# Patient Record
Sex: FEMALE | Race: WHITE | NOT HISPANIC OR LATINO | Employment: UNEMPLOYED | URBAN - METROPOLITAN AREA
[De-identification: names, ages, dates, MRNs, and addresses within clinical notes are randomized per-mention and may not be internally consistent; named-entity substitution may affect disease eponyms.]

---

## 2017-06-26 ENCOUNTER — HISTORICAL (OUTPATIENT)
Dept: ADMINISTRATIVE | Facility: HOSPITAL | Age: 32
End: 2017-06-26

## 2017-06-28 ENCOUNTER — HISTORICAL (OUTPATIENT)
Dept: MEDSURG UNIT | Facility: HOSPITAL | Age: 32
End: 2017-06-28

## 2017-06-28 ENCOUNTER — HISTORICAL (OUTPATIENT)
Dept: ADMINISTRATIVE | Facility: HOSPITAL | Age: 32
End: 2017-06-28

## 2017-07-12 ENCOUNTER — HISTORICAL (OUTPATIENT)
Dept: ADMINISTRATIVE | Facility: HOSPITAL | Age: 32
End: 2017-07-12

## 2017-08-11 ENCOUNTER — HISTORICAL (OUTPATIENT)
Dept: ADMINISTRATIVE | Facility: HOSPITAL | Age: 32
End: 2017-08-11

## 2018-11-14 ENCOUNTER — HISTORICAL (OUTPATIENT)
Dept: LAB | Facility: HOSPITAL | Age: 33
End: 2018-11-14

## 2019-01-11 ENCOUNTER — HISTORICAL (OUTPATIENT)
Dept: RADIOLOGY | Facility: HOSPITAL | Age: 34
End: 2019-01-11

## 2022-04-07 ENCOUNTER — HISTORICAL (OUTPATIENT)
Dept: ADMINISTRATIVE | Facility: HOSPITAL | Age: 37
End: 2022-04-07

## 2022-04-19 ENCOUNTER — HISTORICAL (OUTPATIENT)
Dept: ADMINISTRATIVE | Facility: HOSPITAL | Age: 37
End: 2022-04-19

## 2022-04-23 VITALS
OXYGEN SATURATION: 95 % | BODY MASS INDEX: 21.12 KG/M2 | HEIGHT: 65 IN | WEIGHT: 126.75 LBS | DIASTOLIC BLOOD PRESSURE: 75 MMHG | SYSTOLIC BLOOD PRESSURE: 120 MMHG

## 2022-04-30 NOTE — H&P
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 Went to ED on [facility] and here for first ortho visit, severe R index finger pain.   Review of Systems Constitutional_no fever, no chills  Eye_no trauma  Respiratory_no increased work of breathing  Cardiovascular_regular rate and rhythm  Gastrointestinal_no vomiting  Genitourinary_no burning with urination  Hema/Lymph_no hemorrhage  Musculoskeletal_see note  Integumentary_no rash over extremity  Neurologic_grossly intact, see PE section Physical Exam   Vitals & Measurements HT: 165.10 cm HT: 165.10 cm WT: 62.3 kg WT: 62.3 kg BMI: 22.86 Gen: NAD, A+Ox3  Cardio: RRR  Pulm: No increased WOB    RUE:  Skin intact  deformity due to dorsal subluxation of R index finger PIP joint  Swelling about R index PIP jiont  TTP about R index finger  Motor: intact ain, pin, ulnar  SILT: m/u/r  2+ RP, bcr, wwp    Assessment/Plan 32yF with R P2 base fracture.  - Will place in splint  - NWB RUE  - booked for ORIF vs CRPP vs ex fix of R index finger P2 base fracture on 6/29 Problem List/Past Medical History Tobacco user Historical Back pain Procedure/Surgical History Application of finger splint; static (06/09/2017), Initial treatment, first degree burn, when no more than local treatment is required (06/14/2016), C Section (2009), C Section (2003), Pyloric stenosis sx (1984), BACK SX. Medications CIPROFLOXACIN  MG TABS, 500 mg, 1 tab(s), Oral, BID, Not taking diclofenac sodium 75 mg oral delayed release tablet, 75 mg, 1 tab(s), Oral, BID GABAPENTIN 300 MG CAPS TRAMADOL HCL 50 MG TABS, Not taking Allergies Maria Luisa Aspirin Sugar Free Ultram (break out in rash) Social History   Alcohol - Denies Alcohol Use   Never   Substance Abuse - Denies Substance Abuse   Never   Tobacco - High  Risk   Current every day smoker, Cigarettes, 7 per day. Started age 18.0 Years. Previous treatment: None. Ready to change: Yes.   Cigarettes   Current, Cigarettes, 10 per day. Diagnostic Results XR R hand: P2 base fracture of R index finger with dorsal subluxation of PIP joint. volar fragment roughly 50% of joint surface.   Addendum by Gerard Allred MD on June 26, 2017 11:28:32 CDT (Verified)  Esme's medical history, physical exam findings right index finger, diagnosis, and treatment outlined by the PG3 resident has been reviewed. Operative treatment plan is determined to be reasonable and appropriate. I was present during the evaluation. X-rays right index finger reviewed.  Smoking cessation is important.  Addendum by Thien Beltran MD on June 28, 2017 15:58:50 CDT (Verified)  I have seen and evaluated the patient and there are no changes to the H&P.  Result type: Office/Clinic Note-Physician   Result date: June 26, 2017 11:19 CDT   Result status: Modified   Result title: Office Visit Note   Performed by: Thien Beltran MD on June 26, 2017 11:21 CDT   Verified by: Thien Beltran MD on June 26, 2017 11:21 CDT   Encounter info: [id number], [facility], Clinic Visit, 6/26/2017 - 6/26/2017   Document has been moved by HIM Specialist.

## 2022-04-30 NOTE — DISCHARGE SUMMARY
DISCHARGE DATE:  06/28/2017    ADMISSION DIAGNOSIS:  Closed fracture dislocation of the right index finger middle phalanx.    POSTOPERATIVE DIAGNOSIS:  Closed fracture dislocation of the right index finger middle phalanx.    PROCEDURE PERFORMED:  Open reduction, internal fixation of right index finger P2 base fracture dislocation.    HISTORY AND HOSPITAL COURSE:  The patient is a 32-year-old female who sustained a right index finger P2 base fracture nearly 3 weeks prior to her presentation, on 06/08/2017.  She was admitted to [facility] by [facility] and taken to the OR on 06/28/2017 for open reduction, internal fixation of a right index finger P2 base fracture dislocation.  Patient tolerated the procedure well, no complications.  See operative report for further details.  Postoperative course was benign.  Patient was discharged with proper wound care, followup, weightbearing restriction instructions provided.    WEIGHTBEARING RESTRICTION INSTRUCTIONS:    1. Nonweightbearing, right upper right upper extremity, for 6 weeks.  2. Leave splint in place until followup.  Pin will be in place for 4 weeks.    DISCHARGE DIET:  Regular.    DISCHARGE MEDICATIONS:  See MAR.    FOLLOWUP:  10-14 days.        ______________________________  terri Beltran MD    ______________________________  Ulices Allred M.D.    ADEN/CLARISSA  DD:  07/02/2017  Time:  08:50PM  DT:  07/03/2017  Time:  05:48AM  Job #:  [id number]
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
## 2022-05-01 NOTE — HISTORICAL [facility] CERNER
This is a historical note converted from [facility]. Formatting and pictures may have been removed.  Please reference Samanta for original formatting and attached multimedia. Chief Complaint  s/p ORIF right index finger  History of Present Illness  32F here for first follow up of ORIF of R 2nd PIP fx-dislocation.? Doing well except for pain and stiffness.? Notes numbness about the incision.? Mobic made her nauseous.?  Physical Exam  Vitals & Measurements  HT:?165?cm? HT:?165?cm? WT:?63.5?kg? WT:?63.5?kg? BMI:?23.32?  RUE  Incision well healed without signs of infection  decreased sensation at ulnar?corner of Lucia  PIP motion 0-20, DIP 0-30  NVID  Assessment/Plan  1.?Closed displaced fracture of middle phalanx of finger of right hand  ?Pt very stiff.? Will get into OT asap for aggressive ROM.? Given Rx to take to [place] therapy.?  ?RTC in 6 weeks for motion check  Ordered:  Clinic Follow up, *Est. 09/22/17 3:00:00 CDT, Order for future visit, Closed displaced fracture of middle phalanx of finger of right hand, [facility]  ?   Problem List/Past Medical History  Tobacco user  Historical  Back pain  Procedure/Surgical History  Open treatment of phalangeal shaft fracture, proximal or middle phalanx, finger or thumb, includes internal fixation, when performed, each (06/28/2017), ORIF Finger (Right) (06/28/2017), Reposition Right Finger Phalanx with Internal Fixation Device, Open Approach (06/28/2017), Application of finger splint; static (06/09/2017), Initial treatment, first degree burn, when no more than local treatment is required (06/14/2016), C Section (2009), C Section (2003), Pyloric stenosis sx (1984), BACK SX.  Medications  CIPROFLOXACIN  MG TABS, 500 mg, 1 tab(s), Oral, BID,? ?Not Taking, Completed Rx  GABAPENTIN 300 MG CAPS,? ?Not taking  Allergies  Ultram?(break out in rash)  aspirin?(hives)  Social History  Alcohol - Denies Alcohol Use  Never  Substance Abuse - Denies Substance  Abuse  Never  Tobacco - High Risk  Current every day smoker, Cigarettes, 7 per day. Started age 17.0 Years. Previous treatment: None. Ready to change: Yes.  Cigarettes  Current, Cigarettes, 10 per day.  Diagnostic Results  xrays show hardware in place over PIP      Patient discussed with Dr. Gates.? She is 6 weeks out from ORIF R 2nd PIP fx/dislocation.? Examined and discussed with patient need for more aggressive ROM to R LF.? Patient is understanding.? Will give Rx for OT as well.? Agree with above plan.?

## 2022-05-01 NOTE — HISTORICAL [facility] CERNER
This is a historical note converted from [facility]. Formatting and pictures may have been removed.  Please reference [facility]anta for original formatting and attached multimedia. Chief Complaint  F/U Fx Rt 2nd Digit  History of Present Illness  32F here for first post op follow up of ORIF of R 2nd PIP fx-dislocation.? Doing well except for mild pain.  Physical Exam  Vitals & Measurements  T:?36.6? ?C ?(Oral)? HR:?80?(Peripheral)? BP:?116/80? HT:?165.10?cm? HT:?165.10?cm? WT:?63.5?kg? WT:?63.5?kg? BMI:?23.3?  Pin in place without signs of infection  Suture intact  SILT M/R/U  Motor Intact AIN/PIN/U  Motion deferred  2+ RP  Assessment/Plan  1.?Displaced fracture of medial phalanx of right index finger, subsequent encounter for fracture with routine healing  ?Will place pt in a protective dorsal blocking splint and maintain to assist the pin and maintaining the PIP joint in a flexed posture as well as protecting the dorsal pin. ?She may begin early range of motion of the index finger with gentle active and passive flexion.  ?RTC in 2 weeks?for removal of pin and the dorsal blocking splint.??She is to be nonweightbearing to the right hand for a total of 6 weeks.  ?  ?  Ordered:  Clinic Follow up, *Est. 07/26/17 3:00:00 CDT, Order for future visit, Displaced fracture of medial phalanx of right index finger, subsequent encounter for fracture with routine healing, [facility]  ?  Orders:  acetaminophen-HYDROcodone, 1 tab(s), Oral, q6hr, PRN PRN as needed for pain, X 10 day(s), # 40 tab(s), 0 Refill(s)   Problem List/Past Medical History  Tobacco user  Historical  Back pain  Procedure/Surgical History  Open treatment of phalangeal shaft fracture, proximal or middle phalanx, finger or thumb, includes internal fixation, when performed, each (06/28/2017), ORIF Finger (Right) (06/28/2017), Reposition Right Finger Phalanx with Internal Fixation Device, Open Approach (06/28/2017), Application of finger splint; static (06/09/2017),  Initial treatment, first degree burn, when no more than local treatment is required (06/14/2016), C Section (2009), C Section (2003), Pyloric stenosis sx (1984), BACK SX.  Medications  CIPROFLOXACIN  MG TABS, 500 mg, 1 tab(s), Oral, BID,? ?Not Taking, Completed Rx  GABAPENTIN 300 MG CAPS,? ?Not taking  Norco 10 mg-325 mg oral tablet, 1 tab(s), Oral, q6hr, PRN  Allergies  Ultram?(break out in rash)  aspirin?(hives)  Social History  Alcohol - Denies Alcohol Use  Never  Substance Abuse - Denies Substance Abuse  Never  Tobacco - High Risk  Current every day smoker, Cigarettes, 7 per day. Started age 17.0 Years. Previous treatment: None. Ready to change: Yes.  Cigarettes  Current, Cigarettes, 10 per day.  Diagnostic Results  xrays of finger reveal well fixed and aligned hardware      Sutures out today   ?? Tyras medical history, physical exam findings right hand , diagnosis, and treatment outlined by the PG3?resident has been reviewed.? Post operative treatment plan is determined to be reasonable and appropriate.?I was present during the evaluation. X-rays right hand reviewed. Smoking cessation is important.

## 2022-10-13 ENCOUNTER — TELEPHONE (OUTPATIENT)
Dept: FAMILY MEDICINE | Facility: CLINIC | Age: 37
End: 2022-10-13
Payer: MEDICAID

## 2022-10-13 NOTE — TELEPHONE ENCOUNTER
----- Message from Pebbles Alvarenga sent at 10/13/2022 11:20 AM CDT -----  Regarding: med refill  .Type:  RX Refill Request    Who Called: pt   Refill or New Rx:refil   RX Name and Strength:Gabapentin   How is the patient currently taking it? (ex. 1XDay):3  Is this a 30 day or 90 day RX:  Preferred Pharmacy with phone number:[facility] in [place]   Local or Mail Order:local   Ordering Provider:kaylan   Would the patient rather a call back or a response via [facility]? Call back   Best Call Back Number: [phone]  Additional Information: medication isn't in chart, and patient needs enough to hold her over until her appt.       .Type:  Needs Medical Advice    Who Called:   Symptoms (please be specific):    How long has patient had these symptoms:    Pharmacy name and phone #:    Would the patient rather a call back or a response via [facility]? cb  Best Call Back Number: [phone]  Additional Information: pt needs lab work ordered for her wellness

## 2022-10-31 DIAGNOSIS — Z00.00 WELLNESS EXAMINATION: Primary | ICD-10-CM

## 2023-11-08 ENCOUNTER — HOSPITAL ENCOUNTER (OUTPATIENT)
Dept: CARDIOLOGY | Facility: HOSPITAL | Age: 38
Discharge: HOME | End: 2023-11-08
Payer: MEDICAID

## 2023-11-08 DIAGNOSIS — R06.02 SHORTNESS OF BREATH: Primary | ICD-10-CM

## 2023-11-08 DIAGNOSIS — R06.02 SHORTNESS OF BREATH: ICD-10-CM

## 2023-11-08 DIAGNOSIS — R94.31 NONSPECIFIC ABNORMAL ELECTROCARDIOGRAM (ECG) (EKG): ICD-10-CM

## 2023-11-08 PROCEDURE — 93005 ELECTROCARDIOGRAM TRACING: CPT

## 2023-11-08 PROCEDURE — 99900031 HC PATIENT EDUCATION (STAT)

## 2023-12-06 ENCOUNTER — HOSPITAL ENCOUNTER (EMERGENCY)
Facility: HOSPITAL | Age: 38
Discharge: HOME | End: 2023-12-06
Attending: SPECIALIST
Payer: MEDICAID

## 2023-12-06 VITALS
WEIGHT: 150 LBS | HEART RATE: 85 BPM | DIASTOLIC BLOOD PRESSURE: 78 MMHG | RESPIRATION RATE: 18 BRPM | TEMPERATURE: 98 F | BODY MASS INDEX: 24.99 KG/M2 | OXYGEN SATURATION: 99 % | HEIGHT: 65 IN | SYSTOLIC BLOOD PRESSURE: 120 MMHG

## 2023-12-06 DIAGNOSIS — S60.032A CONTUSION OF LEFT MIDDLE FINGER WITHOUT DAMAGE TO NAIL, INITIAL ENCOUNTER: Primary | ICD-10-CM

## 2023-12-06 DIAGNOSIS — S63.633A SPRAIN OF INTERPHALANGEAL JOINT OF LEFT MIDDLE FINGER, INITIAL ENCOUNTER: ICD-10-CM

## 2023-12-06 DIAGNOSIS — S80.00XA CONTUSION OF KNEE, UNSPECIFIED LATERALITY, INITIAL ENCOUNTER: ICD-10-CM

## 2023-12-06 PROCEDURE — 29130 APPL FINGER SPLINT STATIC: CPT | Mod: F2

## 2023-12-06 PROCEDURE — 25000003 PHARM REV CODE 250: Performed by: SPECIALIST

## 2023-12-06 PROCEDURE — 99283 EMERGENCY DEPT VISIT LOW MDM: CPT

## 2023-12-06 RX ORDER — DICLOFENAC SODIUM 50 MG/1
50 TABLET, DELAYED RELEASE ORAL 3 TIMES DAILY PRN
Qty: 30 TABLET | Refills: 0 | Status: SHIPPED | OUTPATIENT
Start: 2023-12-06

## 2023-12-06 RX ORDER — HYDROCODONE BITARTRATE AND ACETAMINOPHEN 5; 325 MG/1; MG/1
1 TABLET ORAL
Status: COMPLETED | OUTPATIENT
Start: 2023-12-06 | End: 2023-12-06

## 2023-12-06 RX ADMIN — HYDROCODONE BITARTRATE AND ACETAMINOPHEN 1 TABLET: 5; 325 TABLET ORAL at 10:12
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
## 2024-03-26 ENCOUNTER — HOSPITAL ENCOUNTER (EMERGENCY)
Facility: HOSPITAL | Age: 39
Discharge: HOME | End: 2024-03-26
Attending: STUDENT IN AN ORGANIZED HEALTH CARE EDUCATION/TRAINING PROGRAM
Payer: MEDICAID

## 2024-03-26 VITALS
RESPIRATION RATE: 18 BRPM | DIASTOLIC BLOOD PRESSURE: 73 MMHG | SYSTOLIC BLOOD PRESSURE: 116 MMHG | TEMPERATURE: 98 F | HEART RATE: 90 BPM | OXYGEN SATURATION: 96 %

## 2024-03-26 DIAGNOSIS — M25.561 RIGHT KNEE PAIN, UNSPECIFIED CHRONICITY: Primary | ICD-10-CM

## 2024-03-26 DIAGNOSIS — R21 RASH: ICD-10-CM

## 2024-03-26 PROCEDURE — 25000003 PHARM REV CODE 250: Performed by: NURSE PRACTITIONER

## 2024-03-26 PROCEDURE — 99284 EMERGENCY DEPT VISIT MOD MDM: CPT

## 2024-03-26 PROCEDURE — 63600175 PHARM REV CODE 636 W HCPCS: Performed by: NURSE PRACTITIONER

## 2024-03-26 RX ORDER — HYDROXYZINE HYDROCHLORIDE 25 MG/1
25 TABLET, FILM COATED ORAL 3 TIMES DAILY PRN
Qty: 21 TABLET | Refills: 0 | Status: SHIPPED | OUTPATIENT
Start: 2024-03-26 | End: 2024-04-02

## 2024-03-26 RX ORDER — KETOROLAC TROMETHAMINE 10 MG/1
10 TABLET, FILM COATED ORAL EVERY 6 HOURS
Qty: 20 TABLET | Refills: 0 | Status: SHIPPED | OUTPATIENT
Start: 2024-03-26 | End: 2024-03-31

## 2024-03-26 RX ORDER — FAMOTIDINE 20 MG/1
20 TABLET, FILM COATED ORAL
Status: COMPLETED | OUTPATIENT
Start: 2024-03-26 | End: 2024-03-26

## 2024-03-26 RX ORDER — KETOROLAC TROMETHAMINE 10 MG/1
10 TABLET, FILM COATED ORAL
Status: COMPLETED | OUTPATIENT
Start: 2024-03-26 | End: 2024-03-26

## 2024-03-26 RX ORDER — PREDNISONE 20 MG/1
40 TABLET ORAL
Status: COMPLETED | OUTPATIENT
Start: 2024-03-26 | End: 2024-03-26

## 2024-03-26 RX ORDER — PREDNISONE 20 MG/1
40 TABLET ORAL DAILY
Qty: 10 TABLET | Refills: 0 | Status: SHIPPED | OUTPATIENT
Start: 2024-03-26 | End: 2024-03-31

## 2024-03-26 RX ORDER — DIPHENHYDRAMINE HCL 25 MG
25 CAPSULE ORAL
Status: COMPLETED | OUTPATIENT
Start: 2024-03-26 | End: 2024-03-26

## 2024-03-26 RX ADMIN — DIPHENHYDRAMINE HYDROCHLORIDE 25 MG: 25 CAPSULE ORAL at 02:03

## 2024-03-26 RX ADMIN — PREDNISONE 40 MG: 20 TABLET ORAL at 02:03

## 2024-03-26 RX ADMIN — KETOROLAC TROMETHAMINE 10 MG: 10 TABLET, FILM COATED ORAL at 02:03

## 2024-03-26 RX ADMIN — FAMOTIDINE 20 MG: 20 TABLET, FILM COATED ORAL at 02:03

## 2024-03-26 NOTE — Clinical Note
"Esme Kamaraa" Wang was seen and treated in our emergency department on 3/26/2024.  She may return to work on 03/27/2024.       If you have any questions or concerns, please don't hesitate to call.      Nabila Desai, NP"
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
## 2024-06-12 ENCOUNTER — HOSPITAL ENCOUNTER (EMERGENCY)
Facility: HOSPITAL | Age: 39
Discharge: HOME | End: 2024-06-12
Attending: STUDENT IN AN ORGANIZED HEALTH CARE EDUCATION/TRAINING PROGRAM
Payer: MEDICAID

## 2024-06-12 VITALS
SYSTOLIC BLOOD PRESSURE: 106 MMHG | WEIGHT: 150 LBS | HEIGHT: 65 IN | OXYGEN SATURATION: 95 % | RESPIRATION RATE: 16 BRPM | DIASTOLIC BLOOD PRESSURE: 66 MMHG | TEMPERATURE: 100 F | HEART RATE: 86 BPM | BODY MASS INDEX: 24.99 KG/M2

## 2024-06-12 DIAGNOSIS — N12 PYELONEPHRITIS: Primary | ICD-10-CM

## 2024-06-12 DIAGNOSIS — R50.9 FEVER: ICD-10-CM

## 2024-06-12 LAB
ALBUMIN SERPL-MCNC: 3.2 G/DL (ref 3.5–5)
ALBUMIN/GLOB SERPL: 0.8 RATIO (ref 1.1–2)
ALP SERPL-CCNC: 230 UNIT/L (ref 40–150)
ALT SERPL-CCNC: 35 UNIT/L (ref 0–55)
AMPHET UR QL SCN: POSITIVE
ANION GAP SERPL CALC-SCNC: 10 MEQ/L
AST SERPL-CCNC: 23 UNIT/L (ref 5–34)
B-HCG FREE SERPL-ACNC: <2.42 MIU/ML
B-HCG UR QL: NEGATIVE
BACTERIA #/AREA URNS AUTO: ABNORMAL /HPF
BARBITURATE SCN PRESENT UR: NEGATIVE
BASOPHILS # BLD AUTO: 0.02 X10(3)/MCL
BASOPHILS NFR BLD AUTO: 0.1 %
BENZODIAZ UR QL SCN: NEGATIVE
BILIRUB SERPL-MCNC: 0.6 MG/DL
BILIRUB UR QL STRIP.AUTO: NEGATIVE
BUN SERPL-MCNC: 6.7 MG/DL (ref 7–18.7)
CALCIUM SERPL-MCNC: 9.1 MG/DL (ref 8.4–10.2)
CANNABINOIDS UR QL SCN: POSITIVE
CHLORIDE SERPL-SCNC: 99 MMOL/L (ref 98–107)
CLARITY UR: CLEAR
CO2 SERPL-SCNC: 28 MMOL/L (ref 22–29)
COCAINE UR QL SCN: NEGATIVE
COLOR UR AUTO: YELLOW
CREAT SERPL-MCNC: 0.84 MG/DL (ref 0.55–1.02)
CREAT/UREA NIT SERPL: 8
EOSINOPHIL # BLD AUTO: 0.08 X10(3)/MCL (ref 0–0.9)
EOSINOPHIL NFR BLD AUTO: 0.5 %
ERYTHROCYTE [DISTWIDTH] IN BLOOD BY AUTOMATED COUNT: 11.9 % (ref 11.5–17)
FLUAV AG UPPER RESP QL IA.RAPID: NOT DETECTED
FLUBV AG UPPER RESP QL IA.RAPID: NOT DETECTED
GFR SERPLBLD CREATININE-BSD FMLA CKD-EPI: >60 ML/MIN/1.73/M2
GLOBULIN SER-MCNC: 4.2 GM/DL (ref 2.4–3.5)
GLUCOSE SERPL-MCNC: 88 MG/DL (ref 74–100)
GLUCOSE UR QL STRIP: NEGATIVE
HCT VFR BLD AUTO: 35.6 % (ref 37–47)
HGB BLD-MCNC: 11.9 G/DL (ref 12–16)
HGB UR QL STRIP: ABNORMAL
IMM GRANULOCYTES # BLD AUTO: 0.05 X10(3)/MCL (ref 0–0.04)
IMM GRANULOCYTES NFR BLD AUTO: 0.3 %
KETONES UR QL STRIP: NEGATIVE
LACTATE SERPL-SCNC: 0.9 MMOL/L (ref 0.5–2.2)
LEUKOCYTE ESTERASE UR QL STRIP: ABNORMAL
LYMPHOCYTES # BLD AUTO: 1.73 X10(3)/MCL (ref 0.6–4.6)
LYMPHOCYTES NFR BLD AUTO: 10.8 %
MCH RBC QN AUTO: 32.7 PG (ref 27–31)
MCHC RBC AUTO-ENTMCNC: 33.4 G/DL (ref 33–36)
MCV RBC AUTO: 97.8 FL (ref 80–94)
MONOCYTES # BLD AUTO: 1.76 X10(3)/MCL (ref 0.1–1.3)
MONOCYTES NFR BLD AUTO: 11 %
NEUTROPHILS # BLD AUTO: 12.31 X10(3)/MCL (ref 2.1–9.2)
NEUTROPHILS NFR BLD AUTO: 77.3 %
NITRITE UR QL STRIP: NEGATIVE
OPIATES UR QL SCN: NEGATIVE
PCP UR QL: NEGATIVE
PH UR STRIP: 8.5 [PH]
PH UR: 8.5 [PH] (ref 3–11)
PLATELET # BLD AUTO: 348 X10(3)/MCL (ref 130–400)
PMV BLD AUTO: 9.1 FL (ref 7.4–10.4)
POTASSIUM SERPL-SCNC: 3.7 MMOL/L (ref 3.5–5.1)
PROT SERPL-MCNC: 7.4 GM/DL (ref 6.4–8.3)
PROT UR QL STRIP: 100
RBC # BLD AUTO: 3.64 X10(6)/MCL (ref 4.2–5.4)
RBC #/AREA URNS AUTO: ABNORMAL /HPF
SARS-COV-2 RNA RESP QL NAA+PROBE: NOT DETECTED
SODIUM SERPL-SCNC: 137 MMOL/L (ref 136–145)
SP GR UR STRIP.AUTO: 1.02 (ref 1–1.03)
SPECIFIC GRAVITY, URINE AUTO (.000) (OHS): 1.02 (ref 1–1.03)
SQUAMOUS #/AREA URNS AUTO: ABNORMAL /HPF
STREP A PCR (OHS): NOT DETECTED
UROBILINOGEN UR STRIP-ACNC: 4
WBC # SPEC AUTO: 15.95 X10(3)/MCL (ref 4.5–11.5)
WBC #/AREA URNS AUTO: ABNORMAL /HPF

## 2024-06-12 PROCEDURE — 81025 URINE PREGNANCY TEST: CPT | Performed by: STUDENT IN AN ORGANIZED HEALTH CARE EDUCATION/TRAINING PROGRAM

## 2024-06-12 PROCEDURE — 84702 CHORIONIC GONADOTROPIN TEST: CPT | Performed by: STUDENT IN AN ORGANIZED HEALTH CARE EDUCATION/TRAINING PROGRAM

## 2024-06-12 PROCEDURE — 85025 COMPLETE CBC W/AUTO DIFF WBC: CPT | Performed by: STUDENT IN AN ORGANIZED HEALTH CARE EDUCATION/TRAINING PROGRAM

## 2024-06-12 PROCEDURE — 83605 ASSAY OF LACTIC ACID: CPT | Performed by: STUDENT IN AN ORGANIZED HEALTH CARE EDUCATION/TRAINING PROGRAM

## 2024-06-12 PROCEDURE — 81003 URINALYSIS AUTO W/O SCOPE: CPT | Performed by: STUDENT IN AN ORGANIZED HEALTH CARE EDUCATION/TRAINING PROGRAM

## 2024-06-12 PROCEDURE — 96361 HYDRATE IV INFUSION ADD-ON: CPT

## 2024-06-12 PROCEDURE — 99285 EMERGENCY DEPT VISIT HI MDM: CPT | Mod: 25

## 2024-06-12 PROCEDURE — 0240U COVID/FLU A&B PCR: CPT | Performed by: STUDENT IN AN ORGANIZED HEALTH CARE EDUCATION/TRAINING PROGRAM

## 2024-06-12 PROCEDURE — 63600175 PHARM REV CODE 636 W HCPCS: Performed by: STUDENT IN AN ORGANIZED HEALTH CARE EDUCATION/TRAINING PROGRAM

## 2024-06-12 PROCEDURE — 80307 DRUG TEST PRSMV CHEM ANLYZR: CPT | Performed by: STUDENT IN AN ORGANIZED HEALTH CARE EDUCATION/TRAINING PROGRAM

## 2024-06-12 PROCEDURE — 87651 STREP A DNA AMP PROBE: CPT | Performed by: STUDENT IN AN ORGANIZED HEALTH CARE EDUCATION/TRAINING PROGRAM

## 2024-06-12 PROCEDURE — 25000003 PHARM REV CODE 250: Performed by: STUDENT IN AN ORGANIZED HEALTH CARE EDUCATION/TRAINING PROGRAM

## 2024-06-12 PROCEDURE — 25500020 PHARM REV CODE 255: Performed by: STUDENT IN AN ORGANIZED HEALTH CARE EDUCATION/TRAINING PROGRAM

## 2024-06-12 PROCEDURE — 80053 COMPREHEN METABOLIC PANEL: CPT | Performed by: STUDENT IN AN ORGANIZED HEALTH CARE EDUCATION/TRAINING PROGRAM

## 2024-06-12 PROCEDURE — 96365 THER/PROPH/DIAG IV INF INIT: CPT | Mod: 59

## 2024-06-12 PROCEDURE — 96372 THER/PROPH/DIAG INJ SC/IM: CPT | Performed by: STUDENT IN AN ORGANIZED HEALTH CARE EDUCATION/TRAINING PROGRAM

## 2024-06-12 RX ORDER — IBUPROFEN 600 MG/1
600 TABLET ORAL
Status: COMPLETED | OUTPATIENT
Start: 2024-06-12 | End: 2024-06-12

## 2024-06-12 RX ORDER — CIPROFLOXACIN 500 MG/1
500 TABLET ORAL 2 TIMES DAILY
Qty: 20 TABLET | Refills: 0 | Status: SHIPPED | OUTPATIENT
Start: 2024-06-12 | End: 2024-06-22

## 2024-06-12 RX ORDER — HALOPERIDOL 5 MG/ML
5 INJECTION INTRAMUSCULAR
Status: COMPLETED | OUTPATIENT
Start: 2024-06-12 | End: 2024-06-12

## 2024-06-12 RX ORDER — LUBIPROSTONE 8 UG/1
8 CAPSULE ORAL
Status: COMPLETED | OUTPATIENT
Start: 2024-06-12 | End: 2024-06-12

## 2024-06-12 RX ORDER — DIPHENHYDRAMINE HYDROCHLORIDE 50 MG/ML
25 INJECTION INTRAMUSCULAR; INTRAVENOUS
Status: COMPLETED | OUTPATIENT
Start: 2024-06-12 | End: 2024-06-12

## 2024-06-12 RX ADMIN — CEFTRIAXONE SODIUM 1 G: 1 INJECTION, POWDER, FOR SOLUTION INTRAMUSCULAR; INTRAVENOUS at 09:06

## 2024-06-12 RX ADMIN — IBUPROFEN 600 MG: 600 TABLET, FILM COATED ORAL at 08:06

## 2024-06-12 RX ADMIN — SODIUM CHLORIDE 1000 ML: 9 INJECTION, SOLUTION INTRAVENOUS at 08:06

## 2024-06-12 RX ADMIN — IOHEXOL 100 ML: 350 INJECTION, SOLUTION INTRAVENOUS at 09:06

## 2024-06-12 RX ADMIN — LUBIPROSTONE 8 MCG: 8 CAPSULE, GELATIN COATED ORAL at 10:06

## 2024-06-12 RX ADMIN — HALOPERIDOL LACTATE 5 MG: 5 INJECTION, SOLUTION INTRAMUSCULAR at 08:06

## 2024-06-12 RX ADMIN — DIPHENHYDRAMINE HYDROCHLORIDE 25 MG: 50 INJECTION, SOLUTION INTRAMUSCULAR; INTRAVENOUS at 08:06

## 2024-06-13 NOTE — ED PROVIDER NOTES
Encounter Date: 2024       History     Chief Complaint   Patient presents with    Fever     Pt c/o fever, nausea, and congestion x 3 days; reports she also hasn't had a BM in about 14 days. Pt states she was seen at urgent care and swabbed for covid/ flu and it was negative. Pt states she took a gram of tylenol around 1915.      HPI  Patient is a 39 year old female who presents to the ER complaining of fever, nausea, congestion, ongoing for the past 3 days.  Patient endorses lower abdominal pain.  She denies any recent travel, known sick contacts, hematemesis or dark or bloody bowel movements.  Review of patient's allergies indicates:  No Known Allergies  Past Medical History:   Diagnosis Date    Overdose of drug      Past Surgical History:   Procedure Laterality Date    BACK SX       SECTION  2009     SECTION      ORIF Finger (Right)      Pyloric stenosis sx        No family history on file.     Review of Systems   Constitutional:  Positive for fever.   HENT:  Negative for sore throat.    Eyes:  Negative for visual disturbance.   Respiratory:  Negative for shortness of breath.    Cardiovascular:  Negative for chest pain.   Gastrointestinal:  Positive for abdominal pain. Negative for nausea.   Endocrine: Negative for polyuria.   Genitourinary:  Negative for dysuria.   Musculoskeletal:  Negative for back pain.   Skin:  Negative for rash.   Neurological:  Negative for weakness.   Hematological:  Does not bruise/bleed easily.   All other systems reviewed and are negative.      Physical Exam     Initial Vitals [24]   BP Pulse Resp Temp SpO2   (!) 144/71 (!) 118 18 (!) 102.8 °F (39.3 °C) 100 %      MAP       --         Physical Exam    Nursing note and vitals reviewed.  Constitutional: Vital signs are normal. She appears well-developed and well-nourished. She is not diaphoretic. She is active.  Non-toxic appearance. She does not appear ill. She appears distressed.   Patient  flailing around in bed, seemingly in consolable.  Appears to be under the influence   HENT:   Head: Normocephalic and atraumatic.   Eyes: Conjunctivae are normal. Pupils are equal, round, and reactive to light. Right conjunctiva is not injected. Left conjunctiva is not injected.   Neck: Trachea normal. Neck supple.   Normal range of motion.   Full passive range of motion without pain.     Cardiovascular:  Regular rhythm, S1 normal, S2 normal, intact distal pulses and normal pulses.           Tachycardic however no appreciable murmur.  2+ equal radial pulses bilaterally.   Pulmonary/Chest: Breath sounds normal. No respiratory distress.   Abdominal: Abdomen is soft. Bowel sounds are normal. There is abdominal tenderness.   Mild suprapubic tenderness on palpation.  No rebound or guarding noted with palpation to the abdomen.   Musculoskeletal:         General: No edema. Normal range of motion.      Cervical back: Full passive range of motion without pain, normal range of motion and neck supple. No rigidity.      Right lower leg: No swelling. No edema.      Left lower leg: No swelling. No edema.     Neurological: She is alert and oriented to person, place, and time.   Skin: Skin is warm and dry. Capillary refill takes less than 2 seconds.         ED Course   Procedures  Labs Reviewed   COMPREHENSIVE METABOLIC PANEL - Abnormal; Notable for the following components:       Result Value    Blood Urea Nitrogen 6.7 (*)     Albumin 3.2 (*)     Globulin 4.2 (*)     Albumin/Globulin Ratio 0.8 (*)      (*)     All other components within normal limits   CBC WITH DIFFERENTIAL - Abnormal; Notable for the following components:    WBC 15.95 (*)     RBC 3.64 (*)     Hgb 11.9 (*)     Hct 35.6 (*)     MCV 97.8 (*)     MCH 32.7 (*)     Neut # 12.31 (*)     Mono # 1.76 (*)     IG# 0.05 (*)     All other components within normal limits   URINALYSIS, REFLEX TO URINE CULTURE - Abnormal; Notable for the following components:    Protein,   (*)     Blood, UA Trace-Intact (*)     Urobilinogen, UA 4.0 (*)     Leukocyte Esterase, UA Large (*)     All other components within normal limits   DRUG SCREEN, URINE (BEAKER) - Abnormal; Notable for the following components:    Amphetamines, Urine Positive (*)     Cannabinoids, Urine Positive (*)     All other components within normal limits    Narrative:     Cut off concentrations:    Amphetamines - 1000 ng/ml  Barbiturates - 200 ng/ml  Benzodiazepine - 200 ng/ml  Cannabinoids (THC) - 50 ng/ml  Cocaine - 300 ng/ml  Fentanyl - 1.0 ng/ml  MDMA - 500 ng/ml  Opiates - 300 ng/ml   Phencyclidine (PCP) - 25 ng/ml    Specimen submitted for drug analysis and tested for pH and specific gravity in order to evaluate sample integrity. Suspect tampering if specific gravity is <1.003 and/or pH is not within the range of 4.5 - 8.0  False negatives may result form substances such as bleach added to urine.  False positives may result for the presence of a substance with similar chemical structure to the drug or its metabolite.    This test provides only a PRELIMINARY analytical test result. A more specific alternate chemical method must be used in order to obtain a confirmed analytical result. Gas chromatography/mass spectrometry (GC/MS) is the preferred confirmatory method. Other chemical confirmation methods are available. Clinical consideration and professional judgement should be applied to any drug of abuse test result, particularly when preliminary positive results are used.    Positive results will be confirmed only at the physicians request. Unconfirmed screening results are to be used only for medical purposes (treatment).        URINALYSIS, MICROSCOPIC - Abnormal; Notable for the following components:    Bacteria, UA Few (*)     WBC, UA 6-10 (*)     Squamous Epithelial Cells, UA Few (*)     All other components within normal limits   COVID/FLU A&B PCR - Normal    Narrative:     The Xpert Xpress SARS-CoV-2/FLU/RSV  plus is a rapid, multiplexed real-time PCR test intended for the simultaneous qualitative detection and differentiation of SARS-CoV-2, Influenza A, Influenza B, and respiratory syncytial virus (RSV) viral RNA in either nasopharyngeal swab or nasal swab specimens.         STREP GROUP A BY PCR - Normal    Narrative:     The Xpert Xpress Strep A test is a rapid, qualitative in vitro diagnostic test for the detection of Streptococcus pyogenes (Group A ß-hemolytic Streptococcus, Strep A) in throat swab specimens from patients with signs and symptoms of pharyngitis.     PREGNANCY TEST, URINE RAPID - Normal   LACTIC ACID, PLASMA - Normal   HCG, QUANTITATIVE - Normal   CBC W/ AUTO DIFFERENTIAL    Narrative:     The following orders were created for panel order CBC auto differential.  Procedure                               Abnormality         Status                     ---------                               -----------         ------                     CBC with Differential[[id number]]       Abnormal            Final result                 Please view results for these tests on the individual orders.          Imaging Results              CT Abdomen Pelvis With IV Contrast NO Oral Contrast (Final result)  Result time 06/12/24 21:14:03      Final result by Hunter Quintanilla MD (06/12/24 21:14:03)                   Impression:      1. Findings suggestive of pyelonephritis involving the right kidney without evidence for developing abscess.  2. Findings suggestive of constipation.      Electronically signed by: Hunter Quintanilla MD  Date:    06/12/2024  Time:    21:14               Narrative:    EXAMINATION:  CT ABDOMEN PELVIS WITH IV CONTRAST    CLINICAL HISTORY:  Patient complaining lower abdominal febrile on arrival;    TECHNIQUE:  Multiple cross-section obtained from the lung bases to the pubic symphysis after the intravenous administration of 100 mL Omnipaque 350.  Coronal and sagittal reformatted images were obtained.  An  automated dose exposure technique was utilized this limits radiation does the patient.    COMPARISON:  01/29/2017    FINDINGS:  Dependent atelectatic changes lungs.  Heart size within normal limits    The liver, spleen, adrenals, and pancreas are normal.  Gallbladder is atretic.  The right kidney appears to be edematous with several areas of hypoenhancement without evidence for organizing fluid collection.  Perinephric fat stranding is identified.  No evidence for hydronephrosis or calculus bilaterally.    Small hiatal hernia.  Small bowel is of normal caliber.  Colon is of normal caliber with large stool burden throughout the colon.  Appendix is not definitely identified.    Uterus is anteverted.  No evidence for adnexal mass with follicular changes of lung the bilateral pelvic sidewalls.  The bladder is under distended normal.  The course and caliber of the abdominal is normal.  Scattered calcified atheromatous disease.  No free fluid in the abdomen pelvis.  No evidence for adenopathy.    No suspicious osseous lesions.  The soft tissues are grossly normal.                                       X-Ray Chest AP Portable (Final result)  Result time 06/12/24 20:05:17      Final result by Hunter Quintanilla MD (06/12/24 20:05:17)                   Impression:      No acute abnormality.      Electronically signed by: Hunter Quintanilla MD  Date:    06/12/2024  Time:    20:05               Narrative:    EXAMINATION:  XR CHEST AP PORTABLE    CLINICAL HISTORY:  Fever, unspecified    TECHNIQUE:  Single frontal view of the chest was performed.    COMPARISON:  07/30/2021    FINDINGS:  The lungs are clear, with normal appearance of pulmonary vasculature and no pleural effusion or pneumothorax.    The cardiac silhouette is normal in size. The hilar and mediastinal contours are unremarkable.    Bones are intact.                                       Medications   cefTRIAXone (Rocephin) 1 g in dextrose 5 % in water (D5W) 100 mL IVPB (MB+) (1  g Intravenous New Bag 6/12/24 2144)   ibuprofen tablet 600 mg (600 mg Oral Given 6/12/24 2017)   sodium chloride 0.9% bolus 1,000 mL 1,000 mL (0 mLs Intravenous Stopped 6/12/24 2126)   haloperidol lactate injection 5 mg (5 mg Intramuscular Given 6/12/24 2000)   diphenhydrAMINE injection 25 mg (25 mg Intramuscular Given 6/12/24 2025)   iohexoL (OMNIPAQUE 350) injection 100 mL (100 mLs Intravenous Given 6/12/24 2102)     Medical Decision Making  Patient is a 39 year old female who presents to the ER complaining of fever, nausea, congestion, ongoing for the past 3 days.     Differential diagnosis includes but not limited to: Pyelonephritis, UTI, volvulus, SBO, intussusception, intestinal gas, perforated viscus, cholecystitis, cholelithiasis     Patient vitals on arrival noted her to be tachycardic, febrile to 102.8° F. patient endorses mild abdominal pain.  Secondary to fever, abdominal pain, labs obtained including lactic acid, UA, viral swabs, and CT abdomen pelvis.  Labs returned noted leukocytosis of 15.9.  There were no gross electrolyte derangements noted.  No LAWSON.  UA positive for UTI.  Toxicology screen positive for amphetamines, cannabinoids.  CT abdomen obtained read as findings suggestive of pyelonephritis involving the right kidney without evidence of developing abscess.  Patient given IV fluids, antipyretics, Haldol, Rocephin.  Will discharge home on Cipro.  Patient is stable for discharge.  She was counseled on fever control.  Strict return precautions given symptoms worsen, change to return ER further care and evaluation.    Zeferino Velazquez M.D.  Emergency Medicine        Amount and/or Complexity of Data Reviewed  Labs: ordered.  Radiology: ordered.    Risk  Prescription drug management.                                      Clinical Impression:  Final diagnoses:  [R50.9] Fever  [N12] Pyelonephritis (Primary)                 Zeferino Velazquez MD  06/12/24 [id number]

## 2024-08-19 DIAGNOSIS — M54.50 LOW BACK PAIN: Primary | ICD-10-CM

## 2024-08-21 ENCOUNTER — CLINICAL SUPPORT (OUTPATIENT)
Dept: REHABILITATION | Facility: HOSPITAL | Age: 39
End: 2024-08-21
Payer: MEDICAID

## 2024-08-21 DIAGNOSIS — R29.898 DECREASED STRENGTH OF TRUNK AND BACK: ICD-10-CM

## 2024-08-21 DIAGNOSIS — M53.86 DECREASED ROM OF INTERVERTEBRAL DISCS OF LUMBAR SPINE: ICD-10-CM

## 2024-08-21 DIAGNOSIS — G89.29 CHRONIC BILATERAL LOW BACK PAIN WITH BILATERAL SCIATICA: Primary | ICD-10-CM

## 2024-08-21 DIAGNOSIS — R26.89 IMPAIRMENT OF BALANCE: ICD-10-CM

## 2024-08-21 DIAGNOSIS — M25.661 DECREASED RANGE OF MOTION OF BOTH LOWER EXTREMITIES: ICD-10-CM

## 2024-08-21 DIAGNOSIS — M54.42 CHRONIC BILATERAL LOW BACK PAIN WITH BILATERAL SCIATICA: Primary | ICD-10-CM

## 2024-08-21 DIAGNOSIS — R29.898 IMPAIRED STRENGTH OF HIP MUSCLES: ICD-10-CM

## 2024-08-21 DIAGNOSIS — M25.662 DECREASED RANGE OF MOTION OF BOTH LOWER EXTREMITIES: ICD-10-CM

## 2024-08-21 DIAGNOSIS — M54.50 LOW BACK PAIN: Primary | ICD-10-CM

## 2024-08-21 DIAGNOSIS — M54.41 CHRONIC BILATERAL LOW BACK PAIN WITH BILATERAL SCIATICA: Primary | ICD-10-CM

## 2024-08-21 DIAGNOSIS — R29.898 IMPAIRED FLEXIBILITY OF LOWER EXTREMITY: ICD-10-CM

## 2024-08-21 PROBLEM — M54.9 BACK PAIN: Status: ACTIVE | Noted: 2024-08-21

## 2024-08-21 PROBLEM — M25.669 DECREASED ROM OF LOWER EXTREMITY: Status: ACTIVE | Noted: 2024-08-21

## 2024-08-21 PROCEDURE — 97162 PT EVAL MOD COMPLEX 30 MIN: CPT
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
## 2024-08-22 NOTE — PROGRESS NOTES
"[facility]   Physical Therapy Treatment Note     Name: Esme Piña  Clinic Number: [id number]    Therapy Diagnosis:   Encounter Diagnosis   Name Primary?    Back pain, unspecified back location, unspecified back pain laterality, unspecified chronicity Yes     Physician: Aura Chavez F*    Visit Date: 8/23/2024    Physician Orders: PT Eval and Treat   Medical Diagnosis from Referral: M54.50 Low Back Pain   Evaluation Date: 8/21/2024  Authorization Period Expiration: TO BE DETERMINED   Plan of Care Expiration: TO BE DETERMINED   Progress Note Due: 9/18/24  Date of Surgery: 1999 Lumbar fusion   Visit # / Visits authorized: 1/ 18   FOTO: 1/ 3     Precautions: Standard and back    PTA Visit #: 1/5     Time In: 1015   Time Out: 1044  Total Billable Time: 29 minutes    SUBJECTIVE     Pt reports: she is hurting really bad today. She had another fall onto her R knee. She also reports that her Methadone is "really strong today and she feels tired and out of it". She decided to end session early to get rest and possible go to ER to get her R knee checked. States she will do HEP and try to do some more exercises next time.  She was not compliant with home exercise program.      Pain: 9/10  Location: right back  and knee      OBJECTIVE     Objective Measures updated at progress report unless specified.     Treatment     Esme received the treatments listed below:      therapeutic exercises to develop strength, ROM, flexibility, and core stabilization for 29 minutes including:  Therapeutic Exercise Grid     Exercise 1  Exercise 2  Exercise 3  Exercise 4    Exercise :    Seated ham stretch  Seated piriformis stretch  Pelvic tilts  Bridging    Repetition/Time :    3 x 30 sec   3 x 30 sec    10 x 10 sec   10 x 5 sec     Resist or Assist :                  Comment :                 Done :    yes  yes   yes   yes                     Exercise 5  Exercise 6  Exercise 7  Exercise 8    Exercise :    R " bent knee raise   R SLR with traction   Posterior pelvic tilts   Abominal press with stability ball      Repetition/Time :    10   10   10 x 5 sec   10 x 5 sec     Resist or Assist :                  Comment :                  Done :    yes   yes   yes   yes                     Exercise 9  Exercise 10  Exercise 11  Exercise 12    Exercise :    S/L hip mobility   Prone hip ext   DKTC with stability ball         Repetition/Time :    10 ea   10 ea   10 x 5 sec        Resist or Assist :                  Comment :                  Done :    yes   yes   yes                         Exercise 13 Exercise 14  Exercise 15  Exercise 16   Exercise :                  Repetition/Time :                  Resist or Assist :                  Comment :                  Done :                                  Exercise 17 Exercise 18 Exercise 19 Exercise 20    Exercise :                  Repetition/Time :                  Resist or Assist :                  Comment :                  Done :                                        Patient Education and Home Exercises     Home Exercises Provided and Patient Education Provided       Written Home Exercises Provided: Patient instructed to cont prior HEP. Exercises were reviewed and Esme was able to demonstrate them prior to the end of the session.  Esme demonstrated fair  understanding of the education provided. See EMR under Patient Instructions for exercises provided during therapy sessions    ASSESSMENT     Esme had a poor outcome after today's session demonstrating limited tolerance of exercises due to pain, and difficulty with participation due to methadone. She exhibited flat affect and had several moments when she appeared to be asleep or lethargic. She reported that her medicine was taking it's toll on her today and that she wanted to end session early. She was advised to begin HEP and she understood. She continues to exhibit limited lumbar range of motion, lower back and R knee pain,  and difficulty tolerating movement and stretching. Recommend continue with current plan of care and progress as tolerated.    Esme Is not progressing well towards her goals.   Pt prognosis is Guarded.     Pt will continue to benefit from skilled outpatient physical therapy to address the deficits listed in the problem list box on initial evaluation, provide pt/family education and to maximize pt's level of independence in the home and community environment.     Pt's spiritual, cultural and educational needs considered and pt agreeable to plan of care and goals.      Goals: Short Term Goals (4 Weeks):  1. Patient will be compliant with home exercise program to supplement therapy in promoting functional mobility.  2. Patient will perform Cat/Cow with good control to demonstrate improved core strength.  3. Patient will report </= 4/10 pain during thoracolumbar active range of motion to promote functional mobility.  4. Patient will improve impaired lower extremity bilateral manual muscle tests  to >/=4/5 to improve strength for functional tasks.  Long Term Goals (7 Weeks):   1. Patient will improve FOTO score to >/= 36 to decrease perceived limitation with maintaining/changing body position.   2. Patient will perform Bird Dog  with good control to demonstrate improved core strength.  3. Patient will improve impaired lower extremity bilateral manual muscle tests to >/=4+/5 to improve strength for functional tasks.  4. Patient will report no pain during long periods of standing to  promote increased activities outside of the home.    PLAN     Outpatient Physical Therapy 3 times weekly for 4 weeks then 2 times weekly for 3 weeks to include the following interventions: Manual Therapy, Neuromuscular Re-ed, Patient Education, Therapeutic Activities, and Therapeutic Exercise.    Serafin Dooley, PTA

## 2024-08-23 ENCOUNTER — CLINICAL SUPPORT (OUTPATIENT)
Dept: REHABILITATION | Facility: HOSPITAL | Age: 39
End: 2024-08-23
Payer: MEDICAID

## 2024-08-23 DIAGNOSIS — M54.9 BACK PAIN, UNSPECIFIED BACK LOCATION, UNSPECIFIED BACK PAIN LATERALITY, UNSPECIFIED CHRONICITY: Primary | ICD-10-CM

## 2024-08-23 PROCEDURE — 97110 THERAPEUTIC EXERCISES: CPT | Mod: CQ

## 2024-09-12 ENCOUNTER — DOCUMENTATION ONLY (OUTPATIENT)
Dept: REHABILITATION | Facility: HOSPITAL | Age: 39
End: 2024-09-12
Payer: MEDICAID

## 2024-09-12 NOTE — PROGRESS NOTES
[facility]  Physical Therapy Discharge Note    Name: Esme Piña  Clinic Number: [id number]    Physician Orders: PT Eval and Treat   Medical Diagnosis from Referral: M54.50 Low Back Pain   Evaluation Date: 8/21/2024  Authorization Period Expiration: 10/07/24  Plan of Care Expiration: 10/07/24   Progress Note Due: 9/18/24  Date of Surgery: 1999 Lumbar fusion   Visit # / Visits authorized: 1/ 18   FOTO: 1/ 3    Date of Last visit: 8/23/24  Total Visits Received: 2      Subjective :     - Patient did not complete initial visit and stated she had enough and was finished. Patient was contacted the next week when she did not make her regularly scheduled appointment and stated that she had been sick and would contact the office when ready to resume. Patient did not call back and attempts to contact the patient went unanswered.       Objective :      Posture: Elevated Right shoulder / scapula and abducted scapula , Anterior pelvic tilt in standing , elevated left hip   Palpation: Tenderness bilateral lumbar paraspinals L3-S1 R>L   Sensation: intact      Range of Motion/Strength:      Thoracolumbar AROM Pain/Dysfunction with Movement   Flexion 70%  Pain returning to neutral   Extension 100% Preferred motion    Right side bending 70% Pulling on R   Left side bending 60%     Right rotation 30% Pulling on R   Left rotation 30%     Majority of motion is occurring in lower to mid thoracic spine      L/E MMT Right Left Pain/Dysfunction with Movement   Hip Flexion 3+/5 4/5     Hip Extension 3/5 3/5     Hip Abduction 3/5 3+/5     Knee Flexion 3+/5 4/5     Knee Extension 4/5 4+/5     Ankle DF 4/5 4+/5     Ankle PF 3+/5 4/5        Flexibility:                                                       RIGHT             LEFT  Hamstrings       Piriformis       Rectus        Quadriceps       Gastroc             Joint Mobility:   - Thoracic: moderate limitations apophyseal glide      - Lumbar:   moderate limitations  apophyseal glide            Special Tests:   -Straight Leg Raise:   Right : (+) 45 deg  Left : (--) 60 deg  - Squish Test:   Right: (--)  Left : (--)  - SacroIliac Joint : equal bilaterally      Gait Analysis:Without AD Assistance Independent   Deviations: Reciprocal pattern with no deviations noted   Squat: achieved 1/2 squat with heels flat and good movement in knees  Single Leg Stand : R = 0 sec            L= 0 sec      Therapeutic Exercise Grid     Exercise 1  Exercise 2  Exercise 3  Exercise 4    Exercise :    Seated ham stretch  Seated piriformis stretch  Pelvic tilts  Bridging    Repetition/Time :    3 x 30 sec   3 x 30 sec    10 x 10 sec   10 x 5 sec     Resist or Assist :                  Comment :                 Done :    yes  yes   yes   yes                      Exercise 5  Exercise 6  Exercise 7  Exercise 8    Exercise :    R bent knee raise   R SLR with traction   Posterior pelvic tilts   Abominal press with stability ball      Repetition/Time :    10   10   10 x 5 sec   10 x 5 sec     Resist or Assist :                  Comment :                  Done :    yes   yes   yes   yes                      Exercise 9  Exercise 10  Exercise 11  Exercise 12    Exercise :    S/L hip mobility   Prone hip ext   DKTC with stability ball         Repetition/Time :    10 ea   10 ea   10 x 5 sec        Resist or Assist :                  Comment :                  Done :    yes   yes   yes                         Assessment      Esme Piña did not return to physical therapy today for final assessment and discharge.  Esme goals were address as listed below and met as stated.  Esme was issued a home exercise program with handouts throughout this episode of care to reference for continued wellness and physical fitness . Contact information was given at evaluation in case any questions arise in the future or if therapy is needed.    Discharge reason: patient did not return for formal physical therapy. Therapy Authorization  period has     Goals: Short Term Goals (4 Weeks):  1. Patient will be compliant with home exercise program to supplement therapy in promoting functional mobility.  2. Patient will perform Cat/Cow with good control to demonstrate improved core strength.  3. Patient will report </= 4/10 pain during thoracolumbar active range of motion to promote functional mobility.  4. Patient will improve impaired lower extremity bilateral manual muscle tests  to >/=4/5 to improve strength for functional tasks.  Long Term Goals (7 Weeks):   1. Patient will improve FOTO score to >/= 36 to decrease perceived limitation with maintaining/changing body position.   2. Patient will perform Bird Dog  with good control to demonstrate improved core strength.  3. Patient will improve impaired lower extremity bilateral manual muscle tests to >/=4+/5 to improve strength for functional tasks.  4. Patient will report no pain during long periods of standing to  promote increased activities outside of the home.       Plan   This patient is discharged from Physical Therapy. Per [facility] policy the patient will be discharged due to 3 consecutive missed visits. A new referral will be needed to restart therapy.